# Patient Record
Sex: FEMALE | Race: WHITE | NOT HISPANIC OR LATINO | Employment: FULL TIME | URBAN - METROPOLITAN AREA
[De-identification: names, ages, dates, MRNs, and addresses within clinical notes are randomized per-mention and may not be internally consistent; named-entity substitution may affect disease eponyms.]

---

## 2020-03-31 ENCOUNTER — OFFICE VISIT (OUTPATIENT)
Dept: FAMILY MEDICINE CLINIC | Facility: CLINIC | Age: 45
End: 2020-03-31
Payer: COMMERCIAL

## 2020-03-31 DIAGNOSIS — R53.83 FATIGUE, UNSPECIFIED TYPE: ICD-10-CM

## 2020-03-31 DIAGNOSIS — E11.9 TYPE 2 DIABETES MELLITUS WITHOUT COMPLICATION, WITHOUT LONG-TERM CURRENT USE OF INSULIN (HCC): ICD-10-CM

## 2020-03-31 DIAGNOSIS — N20.0 KIDNEY STONES: Primary | ICD-10-CM

## 2020-03-31 PROCEDURE — 99204 OFFICE O/P NEW MOD 45 MIN: CPT | Performed by: FAMILY MEDICINE

## 2020-03-31 NOTE — PROGRESS NOTES
Virtual Regular Visit  Return visit in 1 month  We will call regarding results of her blood tests  Problem List Items Addressed This Visit        Endocrine    Type 2 diabetes mellitus without complication, without long-term current use of insulin (HCC)    Relevant Orders    Hemoglobin A1C    Microalbumin / creatinine urine ratio    UA (URINE) with reflex to Scope    CBC and differential    Comprehensive metabolic panel    Lipid panel    C-peptide       Other    Fatigue    Relevant Orders    Hemoglobin A1C    Microalbumin / creatinine urine ratio    UA (URINE) with reflex to Scope    CBC and differential    Comprehensive metabolic panel    Lipid panel    C-peptide    TSH, 3rd generation with Free T4 reflex      Other Visit Diagnoses     Kidney stones    -  Primary    Relevant Orders    Uric acid               Reason for visit is new patient    Encounter provider Zoey Kent MD    Provider located at [place] [address] [address] [address] [facility]  [phone]      Recent Visits  No visits were found meeting these conditions  Showing recent visits within past 7 days and meeting all other requirements     Today's Visits  Date Type Provider Dept   03/31/20 Office Visit Zoey Kent MD [address] [place] [address] today's visits and meeting all other requirements     Future Appointments  No visits were found meeting these conditions  Showing future appointments within next 150 days and meeting all other requirements        The patient was identified by name and date of birth  Sidney Kyle was informed that this is a telemedicine visit and that the visit is being conducted through [address] [place] [address] and patient was informed that this is not a secure, HIPAA-complaint platform  she agrees to proceed     My office door was closed  No one else was in the room    She acknowledged consent and understanding of privacy and security of the video platform  The patient has agreed to participate and understands they can discontinue the visit at any time  Patient is aware this is a billable service  Subjective  Irma Back is a 37 y o  female she is a new patient to our practice  She has a history of diabetes mellitus and was taken off medication several years ago when she lost weight  She now has gained weight back and complains of fatigue, tremors, polydipsia and polyuria  She also has a history of kidney stones  History reviewed  No pertinent past medical history  Past Surgical History:   Procedure Laterality Date    TUBAL LIGATION         No current outpatient medications on file  No current facility-administered medications for this visit  Allergies   Allergen Reactions    Sulfa Antibiotics        Review of Systems   Constitutional: Positive for fatigue  Negative for fever  HENT: Negative  Eyes: Negative  Respiratory: Negative  Cardiovascular: Negative  Gastrointestinal: Negative  Endocrine: Positive for polydipsia and polyuria  Genitourinary: Positive for frequency  Musculoskeletal: Negative  Allergic/Immunologic: Negative  Neurological: Negative  Hematological: Negative  Video Exam    There were no vitals filed for this visit  Physical Exam     I spent 25 minutes with the patient during this visit    Level of service [id number]

## 2020-12-09 DIAGNOSIS — Z98.84 BARIATRIC SURGERY STATUS: Primary | ICD-10-CM

## 2021-01-13 ENCOUNTER — TELEPHONE (OUTPATIENT)
Dept: FAMILY MEDICINE CLINIC | Facility: CLINIC | Age: 46
End: 2021-01-13

## 2021-01-13 NOTE — TELEPHONE ENCOUNTER
Called pt for a reminder she is due for an office visit  Pt sees Dr Baron Bessie MD , she no longer is a pt at [facility]  Please remove PCP  Thank you

## 2021-02-05 NOTE — TELEPHONE ENCOUNTER
02/05/21 1:14 PM     Thank you for your request  Your request has been received, reviewed, and the patient chart updated  The PCP has successfully been removed with a patient attribution note  This message will now be completed      Thank you  Juice Cohn

## 2021-12-28 ENCOUNTER — OFFICE VISIT (OUTPATIENT)
Dept: DERMATOLOGY | Facility: CLINIC | Age: 46
End: 2021-12-28
Payer: COMMERCIAL

## 2021-12-28 VITALS — HEIGHT: 59 IN | BODY MASS INDEX: 33.47 KG/M2 | TEMPERATURE: 98.2 F | WEIGHT: 166 LBS

## 2021-12-28 DIAGNOSIS — R21 RASH: Primary | ICD-10-CM

## 2021-12-28 PROCEDURE — 88313 SPECIAL STAINS GROUP 2: CPT | Performed by: PATHOLOGY

## 2021-12-28 PROCEDURE — 11104 PUNCH BX SKIN SINGLE LESION: CPT | Performed by: DERMATOLOGY

## 2021-12-28 PROCEDURE — 88305 TISSUE EXAM BY PATHOLOGIST: CPT | Performed by: PATHOLOGY

## 2021-12-28 PROCEDURE — 99204 OFFICE O/P NEW MOD 45 MIN: CPT | Performed by: DERMATOLOGY

## 2021-12-28 RX ORDER — BUPROPION HYDROCHLORIDE 300 MG/1
TABLET ORAL
COMMUNITY
Start: 2021-12-07

## 2021-12-28 NOTE — PATIENT INSTRUCTIONS
Assessment and Plan:  Based on a thorough discussion of this condition and the management approach to it (including a comprehensive discussion of the known risks, side effects and potential benefits of treatment), the patient (family) agrees to implement the following specific plan:   Punch biopsy done in office today    Start Triamcinolone 0 1% ointment apply to affected area twice a day as needed   Plan:  1  Instructed to keep the wound dry and covered for 24-48h and clean thereafter  2  Warning signs of infection were reviewed  3  Recommended that the patient use acetaminophen as needed for pain  4  Sutures if any should be removed in 21 days      Standard post-procedure care has been explained and has been included in written form within the patient's copy of Informed Consent

## 2021-12-28 NOTE — PROGRESS NOTES
[facility] Note     Patient Name: Gretel Kirk  Encounter Date: 12/28/2021     Have you been cared for by a [facility]ologist in the last 3 years and, if so, which one? No    · Have you traveled outside of the [address] [place] [address] in the past 3 months or outside of the [facility] area in the last 2 weeks? No     May we call your Preferred Phone number to discuss your specific medical information? Yes     May we leave a detailed message that includes your specific medical information? Yes      Today's Chief Concerns:   Concern #1:  Rash    Concern #2:      Past Medical History:  Have you personally ever had or currently have any of the following? · Skin cancer (such as Melanoma, Basal Cell Carcinoma, Squamous Cell Carcinoma? (If Yes, please provide more detail)- No  · Eczema: No  · Psoriasis: No  · HIV/AIDS: No  · Hepatitis B or C: No  · Tuberculosis: No  · Systemic Immunosuppression such as Diabetes, Biologic or Immunotherapy, Chemotherapy, Organ Transplantation, Bone Marrow Transplantation (If YES, please provide more detail): No  · Radiation Treatment (If YES, please provide more detail): No  · Any other major medical conditions/concerns? (If Yes, which types)- No         Family History:  Have any of your "first degree relatives" (parent, brother, sister, or child) had any of the following       · Skin cancer such as Melanoma or Merkel Cell Carcinoma or Pancreatic Cancer? No  · Eczema, Asthma, Hay Fever or Seasonal Allergies: No  · Psoriasis or Psoriatic Arthritis: No  · Do any other medical conditions seem to run in your family? If Yes, what condition and which relatives?   No    Current Medications:   (please update all dermatological medications before printing patient's AVS!)      Current Outpatient Medications:     buPROPion (WELLBUTRIN XL) 300 mg 24 hr tablet, , Disp: , Rfl:       Review of Systems:  Have you recently had or currently have any of the following? If YES, what are you doing for the problem? · Fever, chills or unintended weight loss: No  · Sudden loss or change in your vision: No  · Nausea, vomiting or blood in your stool: No  · Painful or swollen joints: YES,   · Wheezing or cough: No  · Changing mole or non-healing wound: No  · Nosebleeds: No  · Excessive sweating: YES,   · Easy or prolonged bleeding? No  · Over the last 2 weeks, how often have you been bothered by the following problems? · Taking little interest or pleasure in doing things: 2 - Several days  · Feeling down, depressed, or hopeless: 2 - Several days  · Rapid heartbeat with epinephrine:  No    · FEMALES ONLY:    · Are you pregnant or planning to become pregnant? No  · Are you currently or planning to be nursing or breast feeding? No    · Any known allergies? Allergies   Allergen Reactions    Sulfa Antibiotics          Physical Exam:     Was a chaperone (Derm Clinical Assistant) present throughout the entire Physical Exam? Yes     Did the Dermatology Team specifically  the patient on the importance of a Full Skin Exam to be sure that nothing is missed clinically?  Yes}  o Did the patient ultimately request or accept a Full Skin Exam?  NO  o Did the patient specifically refuse to have the areas "under-the-bra" examined by the Dermatologist? No  o Did the patient specifically refuse to have the areas "under-the-underwear" examined by the Dermatologist? No    CONSTITUTIONAL:   Vitals:    12/28/21 0847   Temp: 98 2 °F (36 8 °C)   Weight: 75 3 kg (166 lb)   Height: 4' 11" (1 499 m)         PSYCH: Normal mood and affect  EYES: Normal conjunctiva  ENT: Normal lips and oral mucosa  CARDIOVASCULAR: No edema  RESPIRATORY: Normal respirations  HEME/LYMPH/IMMUNO:  No regional lymphadenopathy except as noted below in "ASSESSMENT AND PLAN BY DIAGNOSIS"    SKIN:  FULL ORGAN SYSTEM EXAM   Face Normal except as noted below in Assessment   Neck, Cervical Chain Nodes Normal except as noted below in Assessment   Right Arm/Hand/Fingers Normal except as noted below in Assessment   Left Arm/Hand/Fingers Normal except as noted below in Assessment   Chest Viewed areas Normal except as noted below in Assessment   Abdomen, Umbilicus t   Back/Spine Normal except as noted below in Assessment   Groin/Genitalia/Buttocks NOT EXAMINED   Right Leg,  Normal except as noted below in Assessment   Left Leg,  Normal except as noted below in Assessment        Assessment and Plan by Diagnosis:    History of Present Condition:     Duration:  How long has this been an issue for you?    o  3 months    Location Affected:  Where on the body is this affecting you? o  trunk and extremities    Quality:  Is there any bleeding, pain, itch, burning/irritation, or redness associated with the skin lesion? o  redness, itching    Severity:  Describe any bleeding, pain, itch, burning/irritation, or redness on a scale of 1 to 10 (with 10 being the worst)  o  7   Timing:  Does this condition seem to be there pretty constantly or do you notice it more at specific times throughout the day? o  constant    Context:  Have you ever noticed that this condition seems to be associated with specific activities you do?    o  denies    Modifying Factors:    o Anything that seems to make the condition worse?    -  denies   o What have you tried to do to make the condition better?    -  denies    Associated Signs and Symptoms:  Does this skin lesion seem to be associated with any of the following:  o  SL AMB DERM SIGNS AND SYMPTOMS: Redness and Itching and Scratching     RASH    Physical Exam:   (Anatomic Location); (Size and Morphological Description); (Differential Diagnosis):  o Arms, chest, and legs with red scaly papules and plaques, itching burning and tingling for 3 months; diffdx; pityriasis lichenoides chronica     Pertinent Positives:   Pertinent Negatives:     Additional History of Present Condition: Patient states she has had rash for three months, originally started on her legs and has spread to her arms and chest  PCP told patient maybe ringworm  Assessment and Plan:  Based on a thorough discussion of this condition and the management approach to it (including a comprehensive discussion of the known risks, side effects and potential benefits of treatment), the patient (family) agrees to implement the following specific plan:   Punch biopsy done in office today    Start Triamcinolone 0 1% ointment apply to affected area twice a day as needed     PROCEDURE NOTE:  PUNCH BIOPSY      Performing Physician: Kajal Arm     Anatomic Location; Clinical Description with size (cm); Pre-Op Diagnosis:   o  Left lower leg; red scaly papules and plaques, itching burning and tingling for 3 months; diffdx; pityriasis lichenoides chronica            Anesthesia: 1% xylocaine with epi       Topical anesthesia: None       Indications: To indicate diagnosis and management plan  Procedure Details     Patient informed of the risks (including bleeding,scaring and infection) and benefits of the procedure explained  Verbal and written informed consent obtained  The area was prepped and draped in the usual fashion  Anesthesia was obtained with 1% lidocaine with epinephrine  The skin was then stretched perpendicular to the skin tension lines and a punch biopsy to an appropriate sampling depth was obtained with a 4 mm punch with a forceps and iris scissors  Hemostasis was obtained with 4-0 Ethilon x 2 sutures  Complications:  None      Specimen has been sent for review by Dermatopathology  Plan:  1  Instructed to keep the wound dry and covered for 24-48h and clean thereafter  2  Warning signs of infection were reviewed  3  Recommended that the patient use acetaminophen as needed for pain  4   Sutures if any should be removed in 21 days      Standard post-procedure care has been explained and has been included in written form within the patient's copy of Informed Consent        Scribe Attestation    I,:  Viviana Jacome MA am acting as a scribe while in the presence of the attending physician :       I,:  Fatemeh Kaplan MD personally performed the services described in this documentation    as scribed in my presence :

## 2022-01-10 NOTE — RESULT ENCOUNTER NOTE
DERMATOPATHOLOGY RESULT NOTE    Results reviewed by ordering physician  Reviewed  Sent Plan B Funding            Instructions for Clinical Derm Team:   (remember to route Result Note to appropriate staff):    None    Result & Plan by Specimen:    Specimen A: benign  Plan: triamcinolone

## 2022-06-17 DIAGNOSIS — R21 RASH: ICD-10-CM

## 2022-06-17 NOTE — TELEPHONE ENCOUNTER
Scheduled follow up appointment with patient for 7/7/22  Patient requesting triamcinolone ointment refill in the meantime

## 2022-07-07 ENCOUNTER — OFFICE VISIT (OUTPATIENT)
Dept: DERMATOLOGY | Facility: CLINIC | Age: 47
End: 2022-07-07
Payer: COMMERCIAL

## 2022-07-07 VITALS — HEIGHT: 59 IN | BODY MASS INDEX: 34.27 KG/M2 | WEIGHT: 170 LBS | TEMPERATURE: 97.5 F

## 2022-07-07 DIAGNOSIS — L20.9 ATOPIC DERMATITIS, UNSPECIFIED TYPE: Primary | ICD-10-CM

## 2022-07-07 DIAGNOSIS — R21 RASH: ICD-10-CM

## 2022-07-07 PROCEDURE — 99213 OFFICE O/P EST LOW 20 MIN: CPT | Performed by: DERMATOLOGY

## 2022-07-07 NOTE — PROGRESS NOTES
[facility] Follow Up Note    Patient Name: Priya Lawson  Encounter Date: 7/7/2022    Today's Chief Concerns:  Andrés Gold Concern #1:  Follow up Eczema   Concern #2:    Andrés Gold Concern #3:      Current Medications:    Current Outpatient Medications:     buPROPion (WELLBUTRIN XL) 300 mg 24 hr tablet, , Disp: , Rfl:     triamcinolone (KENALOG) 0 1 % ointment, Apply topically 2 (two) times a day As needed, Disp: 453 6 g, Rfl: 0    CONSTITUTIONAL:   Vitals:    07/07/22 1438   Temp: 97 5 °F (36 4 °C)   Weight: 77 1 kg (170 lb)   Height: 4' 11" (1 499 m)         Specific Alerts:    Have you been seen by a [facility] Dermatologist in the last 3 years? YES    Are you pregnant or planning to become pregnant? No    Are you currently or planning to be nursing or breast feeding? No    Allergies   Allergen Reactions    Sulfa Antibiotics        May we call your Preferred Phone number to discuss your specific medical information? YES    May we leave a detailed message that includes your specific medical information? YES    Have you traveled outside of the [facility] in the past 3 months? No    Do you currently have a pacemaker or defibrillator? No    Do you have any artificial heart valves, joints, plates, screws, rods, stents, pins, etc? No   - If Yes, were any placed within the last 2 years? Do you require any medications prior to a surgical procedure? No   - If Yes, for which procedure? - If Yes, what medications to you require? Are you taking any medications that cause you to bleed more easily ("blood thinners") No    Have you ever experienced a rapid heartbeat with epinephrine? No    Review of Systems:  Have you recently had or currently have any of the following?     · Fever or chills: No  · Night Sweats: No  · Headaches: No  · Weight Gain: No  · Weight Loss: No  · Blurry Vision: No  · Nausea: No  · Vomiting: No  · Diarrhea: No  · Blood in Stool: No  · Abdominal Pain: No  · Itchy Skin: YES  · Painful Joints: No  · Swollen Joints: No  · Muscle Pain: No  · Irregular Mole: No  · Sun Burn: No  · Dry Skin: YES  · Skin Color Changes: No  · Scar or Keloid: No  · Cold Sores/Fever Blisters: No  · Bacterial Infections/MRSA: No  · Anxiety: No  · Depression: No  · Suicidal or Homicidal Thoughts: No      PSYCH: Normal mood and affect  EYES: Normal conjunctiva  ENT: Normal lips and oral mucosa  CARDIOVASCULAR: No edema  RESPIRATORY: Normal respirations  HEME/LYMPH/IMMUNO:  No regional lymphadenopathy except as noted below in ASSESSMENT AND PLAN BY DIAGNOSIS    FULL ORGAN SYSTEM SKIN EXAM (SKIN)   Hair, Scalp, Ears, Face    Neck, Cervical Chain Nodes    Right Arm/Hand/Fingers Normal except as noted below in Assessment   Left Arm/Hand/Fingers Normal except as noted below in Assessment   Chest/Breasts/Axillae    Abdomen, Umbilicus    Back/Spine    Groin/Genitalia/Buttocks    Right Leg, Foot, Toes Normal except as noted below in Assessment   Left Leg, Foot, Toes Normal except as noted below in Assessment       1  FOLLOW UP ATOPIC DERMATITIS     Physical Exam:   Anatomic Location Affected:  Arms and legs   Morphological Description:  Scaly nummular plaques    Body Surface Area Today:  10%   Overall Severity: mild   Pertinent Positives:   Pertinent Negatives: Additional History of Present Condition:  Currently prescribed triamcinolone 0 1% ointment and using twice a day as needed when having a flare  No current flare per patient  Assessment and Plan:  Based on a thorough discussion of this condition and the management approach to it (including a comprehensive discussion of the known risks, side effects and potential benefits of treatment), the patient (family) agrees to implement the following specific plan:  - Recommend sensitive skin care regimen  - detergent free of dyes and perfumes (example, free and clear)  Try washing clothes with extra rinse cycle    - Short lukewarm showers  - White dove bar soap  - Recommend moisturizing whole body with Creams multiple times a day Use moisturizer like Eucerin,Cerave, Vanicream or Aveeno Cream 3 times a day for the dry skin           - avoid aerosols and fragrances in the home (candles, plug ins, perfume, air freshener, etc)   You can continue Triamcinolone 0 1% Ointment as needed for flares  Avoid skin around your eyes and groin  Assessment and Plan:   Atopic Dermatitis is a chronic, itchy skin condition that is very common in children but may occur at any age  It is also known as eczema or atopic eczema   It is the most common form of dermatitis  Atopic dermatitis usually occurs in people who have an atopic tendency    This means they may develop any or all of these closely linked conditions: Atopic dermatitis, asthma, hay fever (allergic rhinitis), eosinophilic esophagitis, and gastroenteritis  Often these conditions run within families with a parent, child or sibling also affected  A family history of asthma, eczema or hay fever is particularly useful in diagnosing atopic dermatitis in infants  Atopic dermatitis arises because of a complex interaction of genetic and environmental factors  These include defects in skin barrier function making the skin more susceptible to irritation by soap and other contact irritants, the weather, temperature and non-specific triggers  There is also an element of immune system dysregulation that is often present  By definition, it is chronic and has a "waxing-waning" nature; flares should be expected but with good education and treatment strategies can be minimized  Some specific tips we discussed:   Dry skin care   Using only mild cleansers (hypoallergenic and without fragrances) and fragrance free detergent (not unscented products which contain a masking agent); we discussed avoiding irritants/fragranced products     The importance of regular application of moisturizers daily (at least 3 times a day)   The known and theoretical side effects of steroids at length, including but not limited to atrophy of skin and increased pressure in eye (glaucoma) and clouding of the eye's lens (cataracts) if used in or around the eye for extended durations   The specific over-the-counter interventions and medications   Side effects, risks and benefits of topical and oral medications discussed   After lengthy discussion of etiology and treatment options, we decided to implement the following personalized treatment plan:      EDUCATION AS INTERVENTION! WHAT IS ATOPIC DERMATITIS? Atopic dermatitis (also called eczema) is a condition of the skin where the skin is dry, red, and itchy  The main function of the skin is to provide a barrier from the environment and is also the first defense of the immune system  In atopic dermatitis the skin barrier is decreased or disrupted, and the skin is easily irritated  As a result, moisture escapes the skin more easily, and environmental allergens and microbes can enter the skin more easily  Consequently, the skin's immune system is altered  If there are increased allergic type cells in the skin, the skin may become red and hyper-excitable   This leads to itching and a subsequent rash  WHY DO PEOPLE GET ATOPIC DERMATITIS? There is no single answer because many factors are involved  It is likely a combination of genetic makeup and environmental triggers and/or exposures  Excessive drying or sweating of the skin, Irritating soaps, dust mites, and pet dander are some of the more common triggers  There is no blood test that can be done to confirm this diagnosis  The history and appearance of the skin is usually sufficient for a diagnosis  However, in some cases if the rash does not fit with the history or respond appropriately to treatment, a skin biopsy may be helpful    Many children do outgrow atopic dermatitis or get better; however, many continue to have sensitive skin into adulthood  Asthma and hay fever are often seen in many patients with atopic dermatitis; however, asthma flares do not necessarily occur at the same time as skin flares  PREVENTING FLARES OF ATOPIC DERMATITIS  The first step is to maintain the skin's barrier function  Keep the skin well moisturized  Avoid irritants and triggers  Use prescribed medicine when there are red or rough areas to help the skin to return to normal as quickly as possible  Try to limit scratching  If you keep the skin well moisturized, and avoid coming in contact with things you know irritate your child's skin, there will be less flares  However, some flares of atopic dermatitis are beyond your control  You should work with your health care provider to come up with a plan that minimizes flares while minimizing long term use of medications that suppress the immune system  WHAT ARE SOME OF THE TRIGGERS? Triggers are different for different people  The most common triggers are:   Heat and sweat for some individuals, cold weather for others   House dust mites, pet fur   Wool; synthetic fabrics like nylon; dyed fabrics   Tobacco smoke    Fragrances in: shampoos, soaps, lotions, laundry detergents, fabric softeners   Saliva or prolonged exposure to water  WHAT ABOUT FOOD ALLERGIES? This is a very controversial topic, as many believe that food allergies are responsible for skin flares  In some cases, specific foods may cause worsening of atopic dermatitis; however this occurs in a minority of cases and usually happens within a few hours of ingestion  While food allergy is more common in children with eczema, foods are specific triggers for flares in only a small percentage of children  If you notice that the skin flares after certain foods you can see if eliminating one food at time makes a difference, as long as your child can still enjoy a well-balanced diet     There are blood (RAST) and skin (PRICK) tests that can check for allergies, but they are often positive in children who are not truly allergic  Therefore it is important that you work with your allergist and dermatologist to determine which foods are relevant and causing true symptoms  Extreme food elimination diets without the guidance of your doctor, which have become more popular in recent years, may even result in worsening of the skin rash due to malnutrition and avoidance of essential nutrients  TREATMENT  Treatments are aimed at minimizing exposure to irritating factors and decreasing  the skin inflammation which results in an itchy rash  There are many different treatment options, which depend on your child's rash, its location, and severity  Topical treatments include corticosteroids and steroid-like creams such as Protopic, Elidel, and Eucrisa, which are believed to not thin the skin  Please read the discussions below regarding risks and benefits of all of these creams  Occasionally bacterial or viral infections can occur which flare the skin and require oral and/or topical antibiotics or antivirals  In some cases bleach baths 2-3 times weekly can be helpful to prevent recurrent infection  For severe disease, strong oral medications such as corticosteroids, methotrexate or azathioprine (Imuran) may be needed  These medications require close monitoring and follow-up  You should discuss the risks/ benefits/alternatives of these medications with your health care provider to come up with the best treatment plan for your child  1) Use moisturizer all over the entire body at least THREE TIMES a day  This keeps the skin moisturized to restore the barrier function  Find a cream or ointment that your child likes - this is the most important  The medicines do not work in the bottle  The thicker the moisturizer, generally the better barrier it provides  Ointments often moisturize better than creams; and creams work better than lotions  Lotions are more useful during the summer when thick greasy ointments are uncomfortable  If you put moisturizer on the skin after bathing, while the skin is damp, it is twice as effective  The moisturizer provides a seal holding the water in the skin  You may bathe your child in warm - not hot - water, for short periods of time (no more than 5-10 minutes at a time) once a day if they like  Lightly pat your child dry with a towel and, while the skin is still damp, (within 3 minutes) apply a moisturizer from head to toe  If your child is using a medicated cream, apply it and allow it to absorb completely BEFORE you apply the moisturizer  2) Apply the prescription medication TWICE A DAY to only the red, rough areas on the skin OR AS [place]  Put the medication on your fingers and gently rub it into the areas  Usually the medicine will help an area within a few days time  Try to put the medicine on for two days after you have noticed that the redness is no longer present; this will help the redness from returning  The severity of the rash and the strength and usage of the medication will determine how quickly you see improvement  It is important that you do not overuse steroid creams, and if you notice a thin, shiny appearance to the skin or broken blood vessels, you should stop using the cream and consult your health care provider regarding possible overuse/overthinning of the skin  The face, armpits and groin have particularly thin and sensitive skin and are therefore most at risk for bad results if steroids are over-used in these sites  3) Avoid triggers  Some children have specific things that trigger itching and rashes, while others may have none that can be identified  It may require a little bit of trial and error to see what applies to your child  Also, triggers can change over time for your child  The most common triggers are listed above; start with these  Avoid the use of fabric softeners in the washing machine or dryer sheets (unless they are fragrance-free)  Try to use laundry detergents, soaps and shampoos that are fragrance-free  You may find it helpful to double-rinse your clothes  Some children are sensitive to house dust mites and they may benefit from a plastic mattress wrap  While food allergy is more common in children with eczema, foods are specific triggers for flares in only a small percentage of children  If you notice that the skin flares after certain foods you can see if eliminating one food at time makes a difference, as long as your child can still enjoy a well-balanced diet  4) Consider using a medication like an anti-histamine by mouth to help control the itching  Scratching only makes the skin more reactive and the barrier function even more disrupted  It can cause both children and their parents to lose sleep! There are different types of anti-itch medications  Some cause more drowsiness than others  Both types are acceptable depending on your child and your preference  Start with Benadryl and if that does not work, ask for a prescription antihistamine      5) About the prescription creams:  Corticosteroid creams and ointments (generally things with "-one" or "radha" on the end of their names): The strength of the cream or ointment depends on the name of the active ingredient  The numbers at the end do not indicate the relative strength  Thus triamcinolone 0 1% ointment, considered a mid-strength corticosteroid, is much stronger than hydrocortisone 1% even though the number following the name is much lower  Topical corticosteroids are very effective in treating atopic dermatitis  When used in the manner prescribed (to rashy areas of skin and for no more than a few weeks at a time to any one area) they are very safe    These are corticosteroids and are anti-inflammatory, not the anabolic steroids like those used illegally by some athletes  Topical non-steroid creams and ointments (immunomodulators): These creams and ointments are also called topical calcineurin inhibitors (TCIs)  These include Protopic ointment and Elidel cream  Crisaborole 2% Linnell Dy) is a prescription ointment that targets an enzyme called PDE4 (phosphodiesterase 4)  It is used on the skin topically to treat mild-to-moderate eczema in adults and children 3years of age and older  In total, these nonsteroidal prescriptions are used to help decrease itching and redness in the skin  They are not as strong as most steroid creams; however, it is believed that they do not thin the skin when overused  They are generally used as second-line medications, though they may be used alone or in conjunction with topical steroids  In sensitive areas such as the face, underarms or groin, they are often recommended  They can sting inflamed skin, but are generally well tolerated once the skin is healing  The FDA placed a black-box warning on both Elidel and Protopic in 2006 based on animal studies using the medications  Some animals developed skin cancer and lymphoma  Subsequently, the FDA released a statement that there is no causal relationship between the two medications and cancer  Because of this concern, there are ongoing studies to evaluate this relationship in humans  So far, there are studies that support the safety of these medications  One showed that the rates of cancer in patient using these medications topically were less than the rates of the general population and another showed that in patient's using the medication over a large area of the body, the levels of the medication in the blood was undetectable      As for Eucrisa, this product is only approved for the topical treatment of mild-to-moderate eczema in patients 3years of age and older; use of the medication in kids younger than 2 is considered off label and has not been formally studied  Burning and stinging are the most commonly reported side effects of this medication  Rarely, this product has been known to cause hives and hypersensitivity reactions; discontinue its use if you develop severe itching, swelling, or redness in the area of application          Scribe Attestation    I,:  Williams Jorgensen am acting as a scribe while in the presence of the attending physician :       I,:  Prince Love MD personally performed the services described in this documentation    as scribed in my presence :

## 2022-07-07 NOTE — PATIENT INSTRUCTIONS
FOLLOW UP ATOPIC DERMATITIS     Physical Exam:  Anatomic Location Affected:  Arms and legs  Morphological Description:  Scaly nummular plaques   Body Surface Area Today:  10%  Overall Severity: mild  Pertinent Positives:  Pertinent Negatives: Additional History of Present Condition:  Currently prescribed triamcinolone 0 1% ointment and using twice a day as needed when having a flare  No current flare per patient  Assessment and Plan:  Based on a thorough discussion of this condition and the management approach to it (including a comprehensive discussion of the known risks, side effects and potential benefits of treatment), the patient (family) agrees to implement the following specific plan:  - Recommend sensitive skin care regimen  - detergent free of dyes and perfumes (example, free and clear)  Try washing clothes with extra rinse cycle  - Short lukewarm showers  - White dove bar soap  - Recommend moisturizing whole body with Creams multiple times a day Use moisturizer like Eucerin,Cerave, Vanicream or Aveeno Cream 3 times a day for the dry skin          - avoid aerosols and fragrances in the home (candles, plug ins, perfume, air freshener, etc)  You can continue Triamcinolone 0 1% Ointment as needed for flares  Avoid skin around your eyes and groin  Assessment and Plan:   Atopic Dermatitis is a chronic, itchy skin condition that is very common in children but may occur at any age  It is also known as eczema or atopic eczema   It is the most common form of dermatitis  Atopic dermatitis usually occurs in people who have an atopic tendency    This means they may develop any or all of these closely linked conditions: Atopic dermatitis, asthma, hay fever (allergic rhinitis), eosinophilic esophagitis, and gastroenteritis  Often these conditions run within families with a parent, child or sibling also affected   A family history of asthma, eczema or hay fever is particularly useful in diagnosing atopic dermatitis in infants  Atopic dermatitis arises because of a complex interaction of genetic and environmental factors  These include defects in skin barrier function making the skin more susceptible to irritation by soap and other contact irritants, the weather, temperature and non-specific triggers  There is also an element of immune system dysregulation that is often present  By definition, it is chronic and has a "waxing-waning" nature; flares should be expected but with good education and treatment strategies can be minimized  Some specific tips we discussed:  Dry skin care  Using only mild cleansers (hypoallergenic and without fragrances) and fragrance free detergent (not unscented products which contain a masking agent); we discussed avoiding irritants/fragranced products  The importance of regular application of moisturizers daily (at least 3 times a day)  The known and theoretical side effects of steroids at length, including but not limited to atrophy of skin and increased pressure in eye (glaucoma) and clouding of the eye's lens (cataracts) if used in or around the eye for extended durations  The specific over-the-counter interventions and medications  Side effects, risks and benefits of topical and oral medications discussed  After lengthy discussion of etiology and treatment options, we decided to implement the following personalized treatment plan:      EDUCATION AS INTERVENTION! WHAT IS ATOPIC DERMATITIS? Atopic dermatitis (also called eczema) is a condition of the skin where the skin is dry, red, and itchy  The main function of the skin is to provide a barrier from the environment and is also the first defense of the immune system  In atopic dermatitis the skin barrier is decreased or disrupted, and the skin is easily irritated  As a result, moisture escapes the skin more easily, and environmental allergens and microbes can enter the skin more easily    Consequently, the skin's immune system is altered  If there are increased allergic type cells in the skin, the skin may become red and hyper-excitable   This leads to itching and a subsequent rash  WHY DO PEOPLE GET ATOPIC DERMATITIS? There is no single answer because many factors are involved  It is likely a combination of genetic makeup and environmental triggers and/or exposures  Excessive drying or sweating of the skin, Irritating soaps, dust mites, and pet dander are some of the more common triggers  There is no blood test that can be done to confirm this diagnosis  The history and appearance of the skin is usually sufficient for a diagnosis  However, in some cases if the rash does not fit with the history or respond appropriately to treatment, a skin biopsy may be helpful  Many children do outgrow atopic dermatitis or get better; however, many continue to have sensitive skin into adulthood  Asthma and hay fever are often seen in many patients with atopic dermatitis; however, asthma flares do not necessarily occur at the same time as skin flares  PREVENTING FLARES OF ATOPIC DERMATITIS  The first step is to maintain the skin's barrier function  Keep the skin well moisturized  Avoid irritants and triggers  Use prescribed medicine when there are red or rough areas to help the skin to return to normal as quickly as possible  Try to limit scratching  If you keep the skin well moisturized, and avoid coming in contact with things you know irritate your child's skin, there will be less flares  However, some flares of atopic dermatitis are beyond your control  You should work with your health care provider to come up with a plan that minimizes flares while minimizing long term use of medications that suppress the immune system  WHAT ARE SOME OF THE TRIGGERS? Triggers are different for different people  The most common triggers are:  Heat and sweat for some individuals, cold weather for others    House dust mites, pet fur  Wool; synthetic fabrics like nylon; dyed fabrics  Tobacco smoke   Fragrances in: shampoos, soaps, lotions, laundry detergents, fabric softeners  Saliva or prolonged exposure to water  WHAT ABOUT FOOD ALLERGIES? This is a very controversial topic, as many believe that food allergies are responsible for skin flares  In some cases, specific foods may cause worsening of atopic dermatitis; however this occurs in a minority of cases and usually happens within a few hours of ingestion  While food allergy is more common in children with eczema, foods are specific triggers for flares in only a small percentage of children  If you notice that the skin flares after certain foods you can see if eliminating one food at time makes a difference, as long as your child can still enjoy a well-balanced diet  There are blood (RAST) and skin (PRICK) tests that can check for allergies, but they are often positive in children who are not truly allergic  Therefore it is important that you work with your allergist and dermatologist to determine which foods are relevant and causing true symptoms  Extreme food elimination diets without the guidance of your doctor, which have become more popular in recent years, may even result in worsening of the skin rash due to malnutrition and avoidance of essential nutrients  TREATMENT  Treatments are aimed at minimizing exposure to irritating factors and decreasing  the skin inflammation which results in an itchy rash  There are many different treatment options, which depend on your child's rash, its location, and severity  Topical treatments include corticosteroids and steroid-like creams such as Protopic, Elidel, and Eucrisa, which are believed to not thin the skin  Please read the discussions below regarding risks and benefits of all of these creams      Occasionally bacterial or viral infections can occur which flare the skin and require oral and/or topical antibiotics or antivirals  In some cases bleach baths 2-3 times weekly can be helpful to prevent recurrent infection  For severe disease, strong oral medications such as corticosteroids, methotrexate or azathioprine (Imuran) may be needed  These medications require close monitoring and follow-up  You should discuss the risks/ benefits/alternatives of these medications with your health care provider to come up with the best treatment plan for your child  1) Use moisturizer all over the entire body at least THREE TIMES a day  This keeps the skin moisturized to restore the barrier function  Find a cream or ointment that your child likes - this is the most important  The medicines do not work in the bottle  The thicker the moisturizer, generally the better barrier it provides  Ointments often moisturize better than creams; and creams work better than lotions  Lotions are more useful during the summer when thick greasy ointments are uncomfortable  If you put moisturizer on the skin after bathing, while the skin is damp, it is twice as effective  The moisturizer provides a seal holding the water in the skin  You may bathe your child in warm - not hot - water, for short periods of time (no more than 5-10 minutes at a time) once a day if they like  Lightly pat your child dry with a towel and, while the skin is still damp, (within 3 minutes) apply a moisturizer from head to toe  If your child is using a medicated cream, apply it and allow it to absorb completely BEFORE you apply the moisturizer  2) Apply the prescription medication TWICE A DAY to only the red, rough areas on the skin OR AS [place]  Put the medication on your fingers and gently rub it into the areas  Usually the medicine will help an area within a few days time  Try to put the medicine on for two days after you have noticed that the redness is no longer present; this will help the redness from returning  The severity of the rash and the strength and usage of the medication will determine how quickly you see improvement  It is important that you do not overuse steroid creams, and if you notice a thin, shiny appearance to the skin or broken blood vessels, you should stop using the cream and consult your health care provider regarding possible overuse/overthinning of the skin  The face, armpits and groin have particularly thin and sensitive skin and are therefore most at risk for bad results if steroids are over-used in these sites  3) Avoid triggers  Some children have specific things that trigger itching and rashes, while others may have none that can be identified  It may require a little bit of trial and error to see what applies to your child  Also, triggers can change over time for your child  The most common triggers are listed above; start with these  Avoid the use of fabric softeners in the washing machine or dryer sheets (unless they are fragrance-free)  Try to use laundry detergents, soaps and shampoos that are fragrance-free  You may find it helpful to double-rinse your clothes  Some children are sensitive to house dust mites and they may benefit from a plastic mattress wrap  While food allergy is more common in children with eczema, foods are specific triggers for flares in only a small percentage of children  If you notice that the skin flares after certain foods you can see if eliminating one food at time makes a difference, as long as your child can still enjoy a well-balanced diet  4) Consider using a medication like an anti-histamine by mouth to help control the itching  Scratching only makes the skin more reactive and the barrier function even more disrupted  It can cause both children and their parents to lose sleep! There are different types of anti-itch medications  Some cause more drowsiness than others    Both types are acceptable depending on your child and your preference  Start with Benadryl and if that does not work, ask for a prescription antihistamine      5) About the prescription creams:  Corticosteroid creams and ointments (generally things with "-one" or "radha" on the end of their names): The strength of the cream or ointment depends on the name of the active ingredient  The numbers at the end do not indicate the relative strength  Thus triamcinolone 0 1% ointment, considered a mid-strength corticosteroid, is much stronger than hydrocortisone 1% even though the number following the name is much lower  Topical corticosteroids are very effective in treating atopic dermatitis  When used in the manner prescribed (to rashy areas of skin and for no more than a few weeks at a time to any one area) they are very safe  These are corticosteroids and are anti-inflammatory, not the anabolic steroids like those used illegally by some athletes  Topical non-steroid creams and ointments (immunomodulators): These creams and ointments are also called topical calcineurin inhibitors (TCIs)  These include Protopic ointment and Elidel cream  Crisaborole 2% Kacie Wei) is a prescription ointment that targets an enzyme called PDE4 (phosphodiesterase 4)  It is used on the skin topically to treat mild-to-moderate eczema in adults and children 3years of age and older  In total, these nonsteroidal prescriptions are used to help decrease itching and redness in the skin  They are not as strong as most steroid creams; however, it is believed that they do not thin the skin when overused  They are generally used as second-line medications, though they may be used alone or in conjunction with topical steroids  In sensitive areas such as the face, underarms or groin, they are often recommended  They can sting inflamed skin, but are generally well tolerated once the skin is healing      The FDA placed a black-box warning on both Elidel and Protopic in 2006 based on animal studies using the medications  Some animals developed skin cancer and lymphoma  Subsequently, the FDA released a statement that there is no causal relationship between the two medications and cancer  Because of this concern, there are ongoing studies to evaluate this relationship in humans  So far, there are studies that support the safety of these medications  One showed that the rates of cancer in patient using these medications topically were less than the rates of the general population and another showed that in patient's using the medication over a large area of the body, the levels of the medication in the blood was undetectable  As for Eucrisa, this product is only approved for the topical treatment of mild-to-moderate eczema in patients 3years of age and older; use of the medication in kids younger than 2 is considered off label and has not been formally studied  Burning and stinging are the most commonly reported side effects of this medication  Rarely, this product has been known to cause hives and hypersensitivity reactions; discontinue its use if you develop severe itching, swelling, or redness in the area of application